# Patient Record
Sex: MALE | Race: WHITE
[De-identification: names, ages, dates, MRNs, and addresses within clinical notes are randomized per-mention and may not be internally consistent; named-entity substitution may affect disease eponyms.]

---

## 2020-12-21 ENCOUNTER — HOSPITAL ENCOUNTER (OUTPATIENT)
Age: 70
End: 2020-12-21
Payer: MEDICARE

## 2020-12-21 DIAGNOSIS — R22.1: Primary | ICD-10-CM

## 2020-12-21 PROCEDURE — 76536 US EXAM OF HEAD AND NECK: CPT

## 2020-12-21 NOTE — DI.US.S_ITS
PROCEDURE:  US SOFT TISSUE HEAD AND NECK  
   
INDICATIONS:  PALPABLE LUMP BASE OF SKULL  
   
TECHNIQUE:    
Real-time scanning was performed of the neck region of interest, with image   
documentation.    
   
COMPARISON:  None.  
   
FINDINGS:  There is an ovoid, circumscribed mass, isoechoic to subcutaneous fat.  It   
measures 4.9 x 1.3 x 3.1 cm and is oriented parallel to the skin surface.  No significant   
internal vascularity.  No posterior shadowing.  The deep margin is defined.  
   
IMPRESSION:  4.9 cm avascular fatty mass corresponding to the palpable abnormality at the   
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  If there is significant or rapid growth, surgical   
consult could be considered.  
   
   
Dictated by: Korin Taylor M.D. on 12/21/2020 at 8:55       
Approved by: Korin Taylor M.D. on 12/21/2020 at 9:07

## 2021-11-02 ENCOUNTER — HOSPITAL ENCOUNTER (OUTPATIENT)
Dept: HOSPITAL 76 - DI.S | Age: 71
Discharge: HOME | End: 2021-11-02
Attending: PHYSICIAN ASSISTANT
Payer: MEDICARE

## 2021-11-02 DIAGNOSIS — R91.8: ICD-10-CM

## 2021-11-02 DIAGNOSIS — U07.1: Primary | ICD-10-CM

## 2021-11-02 NOTE — XRAY REPORT
PROCEDURE:  Chest 2 View X-Ray

 

INDICATIONS:  COVID19 INFECTION

 

TECHNIQUE:  2 view(s) of the chest.  

 

COMPARISON:  None.

 

 

FINDINGS: 

SUPPORT DEVICES: None.

LUNGS/PLEURA: Faint airspace opacities in the left upper right lower lung zones, concerning for devel
oping pneumonic infiltrates. No pleural effusion or space-occupying pneumothorax.

MEDIASTINUM: The cardiomediastinal silhouette is within normal limits.

BONES/SOFT TISSUES: No acute abnormality.

 

IMPRESSION: 

1.Faint airspace opacities, concerning for developing pneumonic infiltrates.

 

 

 

Reviewed by: Braulio Sweeney MD on 11/2/2021 10:50 AM PDT

Approved by: Braulio Sweeney MD on 11/2/2021 10:50 AM PDT

 

 

Station ID:  [id number]

## 2021-11-17 ENCOUNTER — HOSPITAL ENCOUNTER (OUTPATIENT)
Dept: HOSPITAL 76 - DI.S | Age: 71
Discharge: HOME | End: 2021-11-17
Attending: EMERGENCY MEDICINE
Payer: MEDICARE

## 2021-11-17 DIAGNOSIS — Z12.5: ICD-10-CM

## 2021-11-17 DIAGNOSIS — Z20.822: ICD-10-CM

## 2021-11-17 DIAGNOSIS — R50.9: Primary | ICD-10-CM

## 2021-11-17 DIAGNOSIS — Z79.899: ICD-10-CM

## 2021-11-17 LAB
ALBUMIN DIAFP-MCNC: 3.8 G/DL (ref 3.2–5.5)
ALBUMIN/GLOB SERPL: 1.3 {RATIO} (ref 1–2.2)
ALP SERPL-CCNC: 58 IU/L (ref 42–121)
ALT SERPL W P-5'-P-CCNC: 25 IU/L (ref 10–60)
ANION GAP SERPL CALCULATED.4IONS-SCNC: 6 MMOL/L (ref 6–13)
AST SERPL W P-5'-P-CCNC: 19 IU/L (ref 10–42)
BASOPHILS NFR BLD AUTO: 0 10^3/UL (ref 0–0.1)
BASOPHILS NFR BLD AUTO: 0.7 %
BILIRUB BLD-MCNC: 1.1 MG/DL (ref 0.2–1)
BUN SERPL-MCNC: 17 MG/DL (ref 6–20)
CALCIUM UR-MCNC: 8.8 MG/DL (ref 8.5–10.3)
CHLORIDE SERPL-SCNC: 98 MMOL/L (ref 101–111)
CHOLEST SERPL-MCNC: 187 MG/DL
CO2 SERPL-SCNC: 31 MMOL/L (ref 21–32)
CREAT SERPLBLD-SCNC: 1 MG/DL (ref 0.6–1.2)
EOSINOPHIL # BLD AUTO: 0.1 10^3/UL (ref 0–0.7)
EOSINOPHIL NFR BLD AUTO: 1.5 %
ERYTHROCYTE [DISTWIDTH] IN BLOOD BY AUTOMATED COUNT: 13.8 % (ref 12–15)
GFRSERPLBLD MDRD-ARVRAT: 73 ML/MIN/{1.73_M2} (ref 89–?)
GLOBULIN SER-MCNC: 2.9 G/DL (ref 2.1–4.2)
GLUCOSE SERPL-MCNC: 114 MG/DL (ref 70–100)
HCT VFR BLD AUTO: 46.9 % (ref 42–52)
HDLC SERPL-MCNC: 48 MG/DL
HDLC SERPL: 3.9 {RATIO} (ref ?–5)
HGB UR QL STRIP: 15.3 G/DL (ref 14–18)
LDLC SERPL CALC-MCNC: 116 MG/DL
LDLC/HDLC SERPL: 2.4 {RATIO} (ref ?–3.6)
LYMPHOCYTES # SPEC AUTO: 1.4 10^3/UL (ref 1.5–3.5)
LYMPHOCYTES NFR BLD AUTO: 23.4 %
MCH RBC QN AUTO: 30.7 PG (ref 27–31)
MCHC RBC AUTO-ENTMCNC: 32.6 G/DL (ref 32–36)
MCV RBC AUTO: 94.2 FL (ref 80–94)
MONOCYTES # BLD AUTO: 0.5 10^3/UL (ref 0–1)
MONOCYTES NFR BLD AUTO: 7.8 %
NEUTROPHILS # BLD AUTO: 4.1 10^3/UL (ref 1.5–6.6)
NEUTROPHILS # SNV AUTO: 6.2 X10^3/UL (ref 4.8–10.8)
NEUTROPHILS NFR BLD AUTO: 66.3 %
NRBC # BLD AUTO: 0 /100WBC
NRBC # BLD AUTO: 0 X10^3/UL
PDW BLD AUTO: 11 FL (ref 7.4–11.4)
PLATELET # BLD: 171 10^3/UL (ref 130–450)
POTASSIUM SERPL-SCNC: 4.2 MMOL/L (ref 3.5–5)
PROT SPEC-MCNC: 6.7 G/DL (ref 6.7–8.2)
RBC MAR: 4.98 10^6/UL (ref 4.7–6.1)
SODIUM SERPLBLD-SCNC: 135 MMOL/L (ref 135–145)
TRIGL P FAST SERPL-MCNC: 115 MG/DL
VLDLC SERPL-SCNC: 23 MG/DL

## 2021-11-17 PROCEDURE — 85025 COMPLETE CBC W/AUTO DIFF WBC: CPT

## 2021-11-17 PROCEDURE — 71046 X-RAY EXAM CHEST 2 VIEWS: CPT

## 2021-11-17 PROCEDURE — 83721 ASSAY OF BLOOD LIPOPROTEIN: CPT

## 2021-11-17 PROCEDURE — 80061 LIPID PANEL: CPT

## 2021-11-17 PROCEDURE — 36415 COLL VENOUS BLD VENIPUNCTURE: CPT

## 2021-11-17 PROCEDURE — 84153 ASSAY OF PSA TOTAL: CPT

## 2021-11-17 PROCEDURE — 80053 COMPREHEN METABOLIC PANEL: CPT

## 2021-11-17 NOTE — XRAY REPORT
PROCEDURE:  Chest 2 View X-Ray

 

INDICATIONS:  FEVER

 

TECHNIQUE:  2 view(s) of the chest.  

 

COMPARISON:  Chest x-ray 11/2/2021

 

FINDINGS:  

 

Surgical changes and devices:  None.  

 

Lungs and pleura:  No pleural effusions or pneumothorax. Previous areas of faint airspace opacities a
re no longer visualized.

 

Mediastinum:  Mediastinal contours are normal.  Heart size is normal.  

 

Bones and chest wall:  No suspicious bony abnormalities.  Soft tissues appear unremarkable.  

 

IMPRESSION:  No acute pulmonary process.

 

Reviewed by: Charlene Adams MD on 11/17/2021 4:20 PM PST

Approved by: Charlene Adams MD on 11/17/2021 4:20 PM [facility]

 

 

Station ID:  [id number]